# Patient Record
(demographics unavailable — no encounter records)

---

## 2025-01-13 NOTE — PHYSICAL EXAM
[Chaperone Present] : A chaperone was present in the examining room during all aspects of the physical examination [03515] : A chaperone was present during the pelvic exam. [FreeTextEntry2] : Mercedes KIM [Appropriately responsive] : appropriately responsive [Alert] : alert [No Acute Distress] : no acute distress [Regular Rate Rhythm] : regular rate rhythm [Soft] : soft [Non-tender] : non-tender [Non-distended] : non-distended [No Mass] : no mass [Oriented x3] : oriented x3 [Examination Of The Breasts] : a normal appearance [] : multiple nodules were palpated [Labia Majora] : normal [Labia Minora] : normal [Normal] : normal [Uterine Adnexae] : normal

## 2025-01-13 NOTE — PLAN
[FreeTextEntry1] : 33 y/o for well woman visit  Plan: - Pap up to date - multiple nodules and cystic like structures palpated under right nipple, less prominent on left; possibly related to recent egg retrieval? - referral for dx mammogram and breast ultrasound

## 2025-01-13 NOTE — HISTORY OF PRESENT ILLNESS
[FreeTextEntry1] : 33 y/o presents for well woman visit. Had egg retrieval three weeks ago, reports bumps on her right breast following the procedure. Does not feel as though these bumps have increased in size over time, but have not gone away. She never noticed before. Otherwise denies nipple changes or discharge, skin changes. Does not feel so prominently on left breast.